# Patient Record
Sex: MALE | Race: WHITE | Employment: FULL TIME | URBAN - METROPOLITAN AREA
[De-identification: names, ages, dates, MRNs, and addresses within clinical notes are randomized per-mention and may not be internally consistent; named-entity substitution may affect disease eponyms.]

---

## 2018-07-24 ENCOUNTER — RECORDS - HEALTHEAST (OUTPATIENT)
Dept: LAB | Facility: CLINIC | Age: 53
End: 2018-07-24

## 2018-07-24 LAB
ALBUMIN SERPL-MCNC: 4.5 G/DL (ref 3.5–5)
ALP SERPL-CCNC: 45 U/L (ref 45–120)
ALT SERPL W P-5'-P-CCNC: 24 U/L (ref 0–45)
ANION GAP SERPL CALCULATED.3IONS-SCNC: 10 MMOL/L (ref 5–18)
AST SERPL W P-5'-P-CCNC: 30 U/L (ref 0–40)
BILIRUB SERPL-MCNC: 1.2 MG/DL (ref 0–1)
BUN SERPL-MCNC: 17 MG/DL (ref 8–22)
CALCIUM SERPL-MCNC: 9.9 MG/DL (ref 8.5–10.5)
CHLORIDE BLD-SCNC: 104 MMOL/L (ref 98–107)
CHOLEST SERPL-MCNC: 195 MG/DL
CO2 SERPL-SCNC: 27 MMOL/L (ref 22–31)
CREAT SERPL-MCNC: 0.83 MG/DL (ref 0.7–1.3)
ERYTHROCYTE [DISTWIDTH] IN BLOOD BY AUTOMATED COUNT: 12.2 % (ref 11–14.5)
FASTING STATUS PATIENT QL REPORTED: NORMAL
GFR SERPL CREATININE-BSD FRML MDRD: >60 ML/MIN/1.73M2
GLUCOSE BLD-MCNC: 94 MG/DL (ref 70–125)
HCT VFR BLD AUTO: 42.9 % (ref 40–54)
HDLC SERPL-MCNC: 58 MG/DL
HGB BLD-MCNC: 15.6 G/DL (ref 14–18)
LDLC SERPL CALC-MCNC: 124 MG/DL
MCH RBC QN AUTO: 31.8 PG (ref 27–34)
MCHC RBC AUTO-ENTMCNC: 36.4 G/DL (ref 32–36)
MCV RBC AUTO: 87 FL (ref 80–100)
PLATELET # BLD AUTO: 217 THOU/UL (ref 140–440)
PMV BLD AUTO: 10.3 FL (ref 8.5–12.5)
POTASSIUM BLD-SCNC: 4.5 MMOL/L (ref 3.5–5)
PROT SERPL-MCNC: 6.9 G/DL (ref 6–8)
PSA SERPL-MCNC: 0.7 NG/ML (ref 0–3.5)
RBC # BLD AUTO: 4.91 MILL/UL (ref 4.4–6.2)
SODIUM SERPL-SCNC: 141 MMOL/L (ref 136–145)
TRIGL SERPL-MCNC: 66 MG/DL
TSH SERPL DL<=0.005 MIU/L-ACNC: 1.33 UIU/ML (ref 0.3–5)
WBC: 4.1 THOU/UL (ref 4–11)

## 2019-04-25 ENCOUNTER — RECORDS - HEALTHEAST (OUTPATIENT)
Dept: LAB | Facility: CLINIC | Age: 54
End: 2019-04-25

## 2019-04-25 LAB
ALBUMIN SERPL-MCNC: 4.8 G/DL (ref 3.5–5)
ALP SERPL-CCNC: 47 U/L (ref 45–120)
ALT SERPL W P-5'-P-CCNC: 35 U/L (ref 0–45)
ANION GAP SERPL CALCULATED.3IONS-SCNC: 12 MMOL/L (ref 5–18)
AST SERPL W P-5'-P-CCNC: 32 U/L (ref 0–40)
BILIRUB SERPL-MCNC: 0.7 MG/DL (ref 0–1)
BUN SERPL-MCNC: 10 MG/DL (ref 8–22)
CALCIUM SERPL-MCNC: 10.9 MG/DL (ref 8.5–10.5)
CHLORIDE BLD-SCNC: 99 MMOL/L (ref 98–107)
CHOLEST SERPL-MCNC: 233 MG/DL
CO2 SERPL-SCNC: 28 MMOL/L (ref 22–31)
CREAT SERPL-MCNC: 0.84 MG/DL (ref 0.7–1.3)
FASTING STATUS PATIENT QL REPORTED: ABNORMAL
GFR SERPL CREATININE-BSD FRML MDRD: >60 ML/MIN/1.73M2
GLUCOSE BLD-MCNC: 88 MG/DL (ref 70–125)
HDLC SERPL-MCNC: 57 MG/DL
LDLC SERPL CALC-MCNC: 156 MG/DL
MAGNESIUM SERPL-MCNC: 2.4 MG/DL (ref 1.8–2.6)
POTASSIUM BLD-SCNC: 4.4 MMOL/L (ref 3.5–5)
PROT SERPL-MCNC: 7.4 G/DL (ref 6–8)
SODIUM SERPL-SCNC: 139 MMOL/L (ref 136–145)
TRIGL SERPL-MCNC: 102 MG/DL

## 2019-08-19 ENCOUNTER — RECORDS - HEALTHEAST (OUTPATIENT)
Dept: LAB | Facility: CLINIC | Age: 54
End: 2019-08-19

## 2019-08-19 LAB
C DIFF TOX B STL QL: NEGATIVE
RIBOTYPE 027/NAP1/BI: NORMAL

## 2019-08-20 LAB
G LAMBLIA AG STL QL IA: NORMAL
O+P STL MICRO: NORMAL
SHIGA TOXIN 1: NEGATIVE
SHIGA TOXIN 2: NEGATIVE

## 2019-08-22 LAB — BACTERIA SPEC CULT: NORMAL

## 2020-06-16 ENCOUNTER — RECORDS - HEALTHEAST (OUTPATIENT)
Dept: LAB | Facility: CLINIC | Age: 55
End: 2020-06-16

## 2020-06-16 LAB
ALBUMIN SERPL-MCNC: 4.6 G/DL (ref 3.5–5)
ALP SERPL-CCNC: 54 U/L (ref 45–120)
ALT SERPL W P-5'-P-CCNC: 24 U/L (ref 0–45)
ANION GAP SERPL CALCULATED.3IONS-SCNC: 9 MMOL/L (ref 5–18)
AST SERPL W P-5'-P-CCNC: 23 U/L (ref 0–40)
BILIRUB SERPL-MCNC: 1.1 MG/DL (ref 0–1)
BUN SERPL-MCNC: 12 MG/DL (ref 8–22)
CALCIUM SERPL-MCNC: 10.1 MG/DL (ref 8.5–10.5)
CHLORIDE BLD-SCNC: 101 MMOL/L (ref 98–107)
CHOLEST SERPL-MCNC: 229 MG/DL
CO2 SERPL-SCNC: 29 MMOL/L (ref 22–31)
CREAT SERPL-MCNC: 0.89 MG/DL (ref 0.7–1.3)
ERYTHROCYTE [DISTWIDTH] IN BLOOD BY AUTOMATED COUNT: 12 % (ref 11–14.5)
FASTING STATUS PATIENT QL REPORTED: ABNORMAL
GFR SERPL CREATININE-BSD FRML MDRD: >60 ML/MIN/1.73M2
GLUCOSE BLD-MCNC: 95 MG/DL (ref 70–125)
HCT VFR BLD AUTO: 47.9 % (ref 40–54)
HDLC SERPL-MCNC: 53 MG/DL
HGB BLD-MCNC: 16.4 G/DL (ref 14–18)
LDLC SERPL CALC-MCNC: 155 MG/DL
MCH RBC QN AUTO: 31.4 PG (ref 27–34)
MCHC RBC AUTO-ENTMCNC: 34.2 G/DL (ref 32–36)
MCV RBC AUTO: 92 FL (ref 80–100)
PLATELET # BLD AUTO: 241 THOU/UL (ref 140–440)
PMV BLD AUTO: 11 FL (ref 8.5–12.5)
POTASSIUM BLD-SCNC: 4 MMOL/L (ref 3.5–5)
PROT SERPL-MCNC: 7.4 G/DL (ref 6–8)
RBC # BLD AUTO: 5.22 MILL/UL (ref 4.4–6.2)
SODIUM SERPL-SCNC: 139 MMOL/L (ref 136–145)
TRIGL SERPL-MCNC: 107 MG/DL
TSH SERPL DL<=0.005 MIU/L-ACNC: 1.26 UIU/ML (ref 0.3–5)
WBC: 4.9 THOU/UL (ref 4–11)

## 2020-06-18 LAB — B BURGDOR IGG+IGM SER QL: 0.04 INDEX VALUE

## 2020-06-19 LAB
E CHAFFEENSIS IGG TITR SER IF: NORMAL {TITER}
E CHAFFEENSIS IGM TITR SER IF: NORMAL {TITER}

## 2020-10-08 ENCOUNTER — RECORDS - HEALTHEAST (OUTPATIENT)
Dept: LAB | Facility: CLINIC | Age: 55
End: 2020-10-08

## 2020-10-09 LAB — BACTERIA SPEC CULT: NO GROWTH

## 2021-04-27 ENCOUNTER — RECORDS - HEALTHEAST (OUTPATIENT)
Dept: ADMINISTRATIVE | Facility: OTHER | Age: 56
End: 2021-04-27

## 2021-04-27 ENCOUNTER — AMBULATORY - HEALTHEAST (OUTPATIENT)
Dept: CARDIOLOGY | Facility: CLINIC | Age: 56
End: 2021-04-27

## 2021-04-29 ENCOUNTER — OFFICE VISIT - HEALTHEAST (OUTPATIENT)
Dept: CARDIOLOGY | Facility: CLINIC | Age: 56
End: 2021-04-29

## 2021-04-29 DIAGNOSIS — I25.10 ATHEROSCLEROSIS OF NATIVE CORONARY ARTERY OF NATIVE HEART WITHOUT ANGINA PECTORIS: ICD-10-CM

## 2021-04-29 LAB
ATRIAL RATE - MUSE: 66 BPM
DIASTOLIC BLOOD PRESSURE - MUSE: NORMAL
INTERPRETATION ECG - MUSE: NORMAL
P AXIS - MUSE: 74 DEGREES
PR INTERVAL - MUSE: 188 MS
QRS DURATION - MUSE: 106 MS
QT - MUSE: 406 MS
QTC - MUSE: 425 MS
R AXIS - MUSE: 64 DEGREES
SYSTOLIC BLOOD PRESSURE - MUSE: NORMAL
T AXIS - MUSE: 51 DEGREES
VENTRICULAR RATE- MUSE: 66 BPM

## 2021-04-29 RX ORDER — ATORVASTATIN CALCIUM 10 MG/1
40 TABLET, FILM COATED ORAL DAILY
Qty: 90 TABLET | Refills: 3 | Status: SHIPPED | OUTPATIENT
Start: 2021-04-29

## 2021-04-29 ASSESSMENT — MIFFLIN-ST. JEOR: SCORE: 1688.34

## 2021-06-05 VITALS
HEART RATE: 71 BPM | DIASTOLIC BLOOD PRESSURE: 70 MMHG | WEIGHT: 171 LBS | SYSTOLIC BLOOD PRESSURE: 134 MMHG | BODY MASS INDEX: 21.26 KG/M2 | RESPIRATION RATE: 14 BRPM | HEIGHT: 75 IN

## 2021-06-16 PROBLEM — I25.10 CORONARY ATHEROSCLEROSIS OF NATIVE CORONARY ARTERY: Status: ACTIVE | Noted: 2021-04-29

## 2021-06-17 NOTE — PATIENT INSTRUCTIONS - HE
1. Start atorvastatin 10 mg daily.  This should help to bring your LDL down below 70 with a diet you can live with.  2. Moderate your exercise, 150 minutes of moderate exercise each week is adequate to maintain cardiovascular health.  3. Your chest discomfort today is musculoskeletal in nature and is not related to your heart.  4. Follow-up as needed only

## 2021-06-30 NOTE — PROGRESS NOTES
Progress Notes by Loy Monroy MD at 4/29/2021  1:50 PM     Author: Loy Monroy MD Service: -- Author Type: Physician    Filed: 4/29/2021  3:40 PM Encounter Date: 4/29/2021 Status: Signed    : Loy Monroy MD (Physician)         CARDIOLOGY CLINIC CONSULT NOTE     Assessment/Plan:   1.  Atypical chest discomfort.  Likely musculoskeletal in nature.  Reassured  2.  Elevated calcium score with LDL of 100.  Discussed potential benefit of further lipid-lowering therapy.  Would suggest a trial of low-dose atorvastatin 10 mg daily, to see if he can reach an LDL of less than 70 on this agent.  We discussed that there is no reason that a recheck of his calcium score would be helpful.  We also discussed that in general his lifestyle choices suggest a lowered risk for coronary artery disease, and that resuming moderate regular physical activity as a part of that optimal lifestyle for coronary disease reduction.    Follow-up as needed     History of Present Illness:     It is my pleasure to see Juan Joseph at the [facility] clinic for evaluation of atypical chest discomfort.    Juan Joseph is a 56 y.o. male with a past medical history of hyperlipidemia and a family history of coronary disease with father dying at age 53 of a myocardial infarction after previously undergoing 2 bypass surgeries.  He takes no medications but is an avid runner, having completed 30 marathons and runs currently 2 to 4 miles several days a week.    A year ago he was experiencing some dyspnea even at rest and fatigue along with dizziness, tinnitus and lightheadedness.  In retrospect he wonders if he did not have COVID-19.  He did undergo cardiac evaluation at that time including a stress echocardiogram which showed no evidence of inducible ischemia, and had a coronary CT calcium score which showed a level of 350, at the 94th percentile for his age group.  He embarked on aggressive weight-based  diet and succeeded in losing 25 pounds and dropping his total cholesterol from 239 down to 169.  His LDL however remains somewhat elevated at 100.  He does not feel that he could continue this diet long-term as it was quite restrictive.  Continue to have occasional atypical discomfort.  He has had occasional dizziness and lightheadedness after running.  He has also experienced some left upper back pain can be provoked by arm movement.  He is anxious about this discomfort and stopped running.    Had no exertional discomfort while running and his stamina is stable.  He has received his Covid vaccine.  The second dose of the vaccine immediately preceded the onset of some of these symptoms which is caused him additional concern.    Past Medical History:     Patient Active Problem List   Diagnosis   ? Coronary atherosclerosis of native coronary artery       Past Surgical History:   History reviewed. No pertinent surgical history.    Family History:     Family History   Problem Relation Age of Onset   ? Acute Myocardial Infarction Father 53         Social History:    reports that he has never smoked. He does not have any smokeless tobacco history on file.  Drinks 2 or 3 beers about every other week  Works at [employer] as a   Exercise: Avid runner, 2 to 4 miles 2 to 3 days a week    Sleep: No snoring but frequently awakens at night unable to return to sleep.  No associated shortness of breath or diaphoresis.  No daytime sleepiness    Meds:     Current Outpatient Medications   Medication Sig Dispense Refill   ? multivit with minerals/lutein (MULTIVITAMIN 50 PLUS ORAL) 1 tab(s)       No current facility-administered medications for this visit.        Allergies:   Patient has no known allergies.    Review of Systems:     General: WNL  Eyes: WNL  Ears/Nose/Throat: WNL  Lungs: WNL  Heart: WNL  Stomach: WNL  Bladder: WNL  Muscle/Joints: WNL  Skin: WNL  Nervous System: Dizziness  Mental Health: WNL     Blood:  "WNL        Objective:      Physical Exam  171 lb (77.6 kg)  6' 2.5\" (1.892 m)  Body mass index is 21.66 kg/m .  /70 (Patient Site: Left Arm, Patient Position: Sitting, Cuff Size: Adult Regular)   Pulse 71   Resp 14   Ht 6' 2.5\" (1.892 m)   Wt 171 lb (77.6 kg)   BMI 21.66 kg/m          General Appearance : Awake, Alert, No acute distress.  Anxious  HEENT: No Scleral icterus; the mucous membranes were pink and moist.  Conjunctivae not injected  Neck:  No cervical bruits, jugular venous distention, or thyromegaly   Chest: The spine was straight. Chest wall symmetric  Lungs: Respirations unlabored; the lungs are clear to auscultation.  No wheezing   Cardiovascular:   Normal point of maximal impulse.  Auscultation reveals normal first and second heart sounds with no murmurs, rubs, or gallops.  Carotid, radial, and dorsalis pedal pulses are intact and symmetric.  Abdomen: Flat.  No organomegaly, masses, bruits, or tenderness. Bowels sounds are present  Extremities: No edema  Skin: No xanthelasma. Warm, Dry.  Musculoskeletal: No tenderness.  Neurologic: Alert and oriented ×3. Speech is fluent.      EKG (personally reviewed):  Normal sinus rhythm with sinus arrhythmia.  Normal ECG    Cardiac Imaging Studies:  Stress echo 7/2020: [facility]  1. Good effort tolerance.  12 minutes 22 seconds on Baltazar protocol, 100% of predicted maximal heart rate  2. Adequate stress for interpretation with patient achieving 100 percent   of predicted maximum HR.  3. No subjective, hemodynamic or ECG evidence of inducible ischemia.  4. No echocardiographic evidence of ischemia.    Coronary calcium score [facility] 6/2020:  1. Total Agatston calcium score is 350, which places the patient at the   94th percentile in comparison to subjects of the same age, gender, and   race/ethnicity who are free of clinical cardiovascular disease and treated   diabetes in the PEDROZA database.    Lab Review   Lab Results "   Component Value Date     06/16/2020    K 4.0 06/16/2020     06/16/2020    CO2 29 06/16/2020    BUN 12 06/16/2020    CREATININE 0.89 06/16/2020    CALCIUM 10.1 06/16/2020     Lab Results   Component Value Date    WBC 4.9 06/16/2020    HGB 16.4 06/16/2020    HCT 47.9 06/16/2020    MCV 92 06/16/2020     06/16/2020     Lab Results   Component Value Date    CHOL 229 (H) 06/16/2020    TRIG 107 06/16/2020    HDL 53 06/16/2020    LDLCALC 155 (H) 06/16/2020     No results found for: TROPONINI  No results found for: BNP  Lab Results   Component Value Date    TSH 1.26 06/16/2020       Loy Monroy MD [facility]      This note created using Dragon voice recognition software. Sound alike errors may have escaped editing.

## 2021-08-23 ENCOUNTER — LAB REQUISITION (OUTPATIENT)
Dept: LAB | Facility: CLINIC | Age: 56
End: 2021-08-23
Payer: COMMERCIAL

## 2021-08-23 DIAGNOSIS — E78.2 MIXED HYPERLIPIDEMIA: ICD-10-CM

## 2021-08-23 DIAGNOSIS — R20.2 PARESTHESIA OF SKIN: ICD-10-CM

## 2021-08-23 DIAGNOSIS — Z12.5 ENCOUNTER FOR SCREENING FOR MALIGNANT NEOPLASM OF PROSTATE: ICD-10-CM

## 2021-08-23 LAB
ALBUMIN SERPL-MCNC: 4.5 G/DL (ref 3.5–5)
ALP SERPL-CCNC: 56 U/L (ref 45–120)
ALT SERPL W P-5'-P-CCNC: 32 U/L (ref 0–45)
ANION GAP SERPL CALCULATED.3IONS-SCNC: 10 MMOL/L (ref 5–18)
AST SERPL W P-5'-P-CCNC: 34 U/L (ref 0–40)
BILIRUB SERPL-MCNC: 1 MG/DL (ref 0–1)
BUN SERPL-MCNC: 13 MG/DL (ref 8–22)
CALCIUM SERPL-MCNC: 10.1 MG/DL (ref 8.5–10.5)
CHLORIDE BLD-SCNC: 102 MMOL/L (ref 98–107)
CHOLEST SERPL-MCNC: 149 MG/DL
CK SERPL-CCNC: 220 U/L (ref 30–190)
CO2 SERPL-SCNC: 27 MMOL/L (ref 22–31)
CREAT SERPL-MCNC: 0.8 MG/DL (ref 0.7–1.3)
GFR SERPL CREATININE-BSD FRML MDRD: >90 ML/MIN/1.73M2
GLUCOSE BLD-MCNC: 92 MG/DL (ref 70–125)
HDLC SERPL-MCNC: 55 MG/DL
LDLC SERPL CALC-MCNC: 81 MG/DL
POTASSIUM BLD-SCNC: 4.8 MMOL/L (ref 3.5–5)
PROT SERPL-MCNC: 7.2 G/DL (ref 6–8)
PSA SERPL-MCNC: 0.66 UG/L (ref 0–3.5)
SODIUM SERPL-SCNC: 139 MMOL/L (ref 136–145)
TRIGL SERPL-MCNC: 64 MG/DL
VIT B12 SERPL-MCNC: 940 PG/ML (ref 213–816)

## 2021-08-23 PROCEDURE — 80053 COMPREHEN METABOLIC PANEL: CPT | Mod: ORL | Performed by: FAMILY MEDICINE

## 2021-08-23 PROCEDURE — 80061 LIPID PANEL: CPT | Mod: ORL | Performed by: FAMILY MEDICINE

## 2021-08-23 PROCEDURE — 82607 VITAMIN B-12: CPT | Mod: ORL | Performed by: FAMILY MEDICINE

## 2021-08-23 PROCEDURE — 82550 ASSAY OF CK (CPK): CPT | Mod: ORL | Performed by: FAMILY MEDICINE

## 2021-08-23 PROCEDURE — G0103 PSA SCREENING: HCPCS | Mod: ORL | Performed by: FAMILY MEDICINE

## 2021-09-24 ENCOUNTER — LAB REQUISITION (OUTPATIENT)
Dept: LAB | Facility: CLINIC | Age: 56
End: 2021-09-24

## 2021-09-24 DIAGNOSIS — R20.2 PARESTHESIA OF SKIN: ICD-10-CM

## 2021-09-24 DIAGNOSIS — M79.609 PAIN IN UNSPECIFIED LIMB: ICD-10-CM

## 2021-09-24 LAB
ERYTHROCYTE [SEDIMENTATION RATE] IN BLOOD BY WESTERGREN METHOD: 2 MM/HR (ref 0–15)
TSH SERPL DL<=0.005 MIU/L-ACNC: 2.04 UIU/ML (ref 0.3–5)

## 2021-09-24 PROCEDURE — 85652 RBC SED RATE AUTOMATED: CPT | Performed by: FAMILY MEDICINE

## 2021-09-24 PROCEDURE — 84443 ASSAY THYROID STIM HORMONE: CPT | Performed by: FAMILY MEDICINE

## 2021-09-24 PROCEDURE — 36415 COLL VENOUS BLD VENIPUNCTURE: CPT | Performed by: FAMILY MEDICINE

## 2021-09-24 PROCEDURE — 86618 LYME DISEASE ANTIBODY: CPT | Performed by: FAMILY MEDICINE

## 2021-09-27 LAB — B BURGDOR IGG+IGM SER QL: 0.04

## 2022-03-01 ENCOUNTER — HOSPITAL ENCOUNTER (EMERGENCY)
Facility: HOSPITAL | Age: 57
Discharge: HOME | End: 2022-03-01
Attending: EMERGENCY MEDICINE | Admitting: EMERGENCY MEDICINE
Payer: COMMERCIAL

## 2022-03-01 ENCOUNTER — APPOINTMENT (OUTPATIENT)
Dept: CT IMAGING | Facility: HOSPITAL | Age: 57
End: 2022-03-01
Attending: EMERGENCY MEDICINE
Payer: COMMERCIAL

## 2022-03-01 VITALS
HEART RATE: 61 BPM | TEMPERATURE: 99.2 F | SYSTOLIC BLOOD PRESSURE: 124 MMHG | DIASTOLIC BLOOD PRESSURE: 82 MMHG | BODY MASS INDEX: 23.49 KG/M2 | OXYGEN SATURATION: 100 % | WEIGHT: 183 LBS | RESPIRATION RATE: 11 BRPM | HEIGHT: 74 IN

## 2022-03-01 DIAGNOSIS — R07.9 CHEST PAIN, UNSPECIFIED TYPE: ICD-10-CM

## 2022-03-01 PROBLEM — Z86.16 PERSONAL HISTORY OF COVID-19: Status: ACTIVE | Noted: 2022-03-01

## 2022-03-01 PROBLEM — R20.9 SENSORY DISTURBANCE: Status: ACTIVE | Noted: 2021-11-05

## 2022-03-01 PROBLEM — N20.0 KIDNEY STONE: Status: ACTIVE | Noted: 2022-03-01

## 2022-03-01 PROBLEM — E78.2 MIXED HYPERLIPIDEMIA: Status: ACTIVE | Noted: 2022-03-01

## 2022-03-01 LAB
ANION GAP SERPL CALCULATED.3IONS-SCNC: 12 MMOL/L (ref 5–18)
BASOPHILS # BLD AUTO: 0.1 10E3/UL (ref 0–0.2)
BASOPHILS NFR BLD AUTO: 1 %
BUN SERPL-MCNC: 12 MG/DL (ref 8–22)
CALCIUM SERPL-MCNC: 9.6 MG/DL (ref 8.5–10.5)
CHLORIDE BLD-SCNC: 101 MMOL/L (ref 98–107)
CO2 SERPL-SCNC: 25 MMOL/L (ref 22–31)
CREAT SERPL-MCNC: 0.79 MG/DL (ref 0.7–1.3)
EOSINOPHIL # BLD AUTO: 0.3 10E3/UL (ref 0–0.7)
EOSINOPHIL NFR BLD AUTO: 4 %
ERYTHROCYTE [DISTWIDTH] IN BLOOD BY AUTOMATED COUNT: 11.9 % (ref 10–15)
GFR SERPL CREATININE-BSD FRML MDRD: >90 ML/MIN/1.73M2
GLUCOSE BLD-MCNC: 115 MG/DL (ref 70–125)
HCT VFR BLD AUTO: 43.2 % (ref 40–53)
HGB BLD-MCNC: 15.4 G/DL (ref 13.3–17.7)
IMM GRANULOCYTES # BLD: 0 10E3/UL
IMM GRANULOCYTES NFR BLD: 0 %
LYMPHOCYTES # BLD AUTO: 2.7 10E3/UL (ref 0.8–5.3)
LYMPHOCYTES NFR BLD AUTO: 47 %
MCH RBC QN AUTO: 32.6 PG (ref 26.5–33)
MCHC RBC AUTO-ENTMCNC: 35.6 G/DL (ref 31.5–36.5)
MCV RBC AUTO: 92 FL (ref 78–100)
MONOCYTES # BLD AUTO: 0.6 10E3/UL (ref 0–1.3)
MONOCYTES NFR BLD AUTO: 10 %
NEUTROPHILS # BLD AUTO: 2.2 10E3/UL (ref 1.6–8.3)
NEUTROPHILS NFR BLD AUTO: 38 %
NRBC # BLD AUTO: 0 10E3/UL
NRBC BLD AUTO-RTO: 0 /100
PLATELET # BLD AUTO: 236 10E3/UL (ref 150–450)
POTASSIUM BLD-SCNC: 4.1 MMOL/L (ref 3.5–5)
RBC # BLD AUTO: 4.72 10E6/UL (ref 4.4–5.9)
SODIUM SERPL-SCNC: 138 MMOL/L (ref 136–145)
TROPONIN I SERPL-MCNC: <0.01 NG/ML (ref 0–0.29)
TROPONIN I SERPL-MCNC: <0.01 NG/ML (ref 0–0.29)
WBC # BLD AUTO: 5.8 10E3/UL (ref 4–11)

## 2022-03-01 PROCEDURE — 74174 CTA ABD&PLVS W/CONTRAST: CPT

## 2022-03-01 PROCEDURE — 82310 ASSAY OF CALCIUM: CPT | Performed by: EMERGENCY MEDICINE

## 2022-03-01 PROCEDURE — 84484 ASSAY OF TROPONIN QUANT: CPT | Performed by: EMERGENCY MEDICINE

## 2022-03-01 PROCEDURE — 250N000011 HC RX IP 250 OP 636: Performed by: EMERGENCY MEDICINE

## 2022-03-01 PROCEDURE — 99285 EMERGENCY DEPT VISIT HI MDM: CPT | Mod: 25

## 2022-03-01 PROCEDURE — 85025 COMPLETE CBC W/AUTO DIFF WBC: CPT | Performed by: EMERGENCY MEDICINE

## 2022-03-01 PROCEDURE — 93005 ELECTROCARDIOGRAM TRACING: CPT | Performed by: EMERGENCY MEDICINE

## 2022-03-01 PROCEDURE — 36415 COLL VENOUS BLD VENIPUNCTURE: CPT | Performed by: EMERGENCY MEDICINE

## 2022-03-01 RX ORDER — IOPAMIDOL 755 MG/ML
100 INJECTION, SOLUTION INTRAVASCULAR ONCE
Status: COMPLETED | OUTPATIENT
Start: 2022-03-01 | End: 2022-03-01

## 2022-03-01 RX ADMIN — IOPAMIDOL 100 ML: 755 INJECTION, SOLUTION INTRAVENOUS at 18:32

## 2022-03-01 NOTE — ED PROVIDER NOTES
ED triage provider note    Patient evaluated for chest pain.  Positive family history    He appears anxious.  Blood pressure is elevated    Cardiac work-up initiated; I have ordered CT of the chest because he is having chest and back pain with radiation of the left upper extremity    symptoms of left arm pain and numbness with left scapular pain can be caused by cervical radiculopathy.    EKG does not show any acute ischemic changes     Jr Nance MD  03/01/22 1704       Jr Nance MD  03/01/22 0134

## 2022-03-01 NOTE — ED NOTES
"Patient irritated with staff complaining in the hallway about the process here and this writer went to explain to him the process for a chest pain like complaint. Patient was again irritated with this writer and states \"okay yeesh I was just saying its just not how [facility] does it.\"  "

## 2022-03-01 NOTE — ED TRIAGE NOTES
"Patient reports he has back pain that is left sided and now has some pain in the left arm pit and under his arm pit with some numbness under the left arm pit. Patient reports he is anxious as well. He states that he is concerned for an MI due to his family history. Patient is covid recovered x1 month but states he did not feel \"the same since having covid\"  "

## 2022-03-02 LAB
ATRIAL RATE - MUSE: 81 BPM
DIASTOLIC BLOOD PRESSURE - MUSE: NORMAL MMHG
INTERPRETATION ECG - MUSE: NORMAL
P AXIS - MUSE: 76 DEGREES
PR INTERVAL - MUSE: 192 MS
QRS DURATION - MUSE: 108 MS
QT - MUSE: 394 MS
QTC - MUSE: 457 MS
R AXIS - MUSE: 52 DEGREES
SYSTOLIC BLOOD PRESSURE - MUSE: NORMAL MMHG
T AXIS - MUSE: 39 DEGREES
VENTRICULAR RATE- MUSE: 81 BPM

## 2022-03-02 NOTE — DISCHARGE INSTRUCTIONS
Follow-up with your primary care doctor if the numbness to the arm is not resolving.  Given your exertional calf pain would also recommend consideration for outpatient arterial ultrasound of the legs to evaluate for claudication.  Because it has been greater than a year since your last stress test, with your family history, could consider outpatient stress test.  Discussed this with your PCP to help arrange.

## 2022-03-02 NOTE — ED PROVIDER NOTES
EMERGENCY DEPARTMENT ENCOUNTER      NAME: Juan Joseph  AGE: 56 year old male  YOB: 1965  MRN: [id number]  EVALUATION DATE & TIME: 3/1/2022  6:07 PM    PCP: Juan Bullard    ED PROVIDER: Chayo Jimenez MD    No chief complaint on file.        FINAL IMPRESSION:  1. Chest pain, unspecified type          ED COURSE & MEDICAL DECISION MAKING:    Pertinent Labs & Imaging studies reviewed. (See chart for details)  56 year old male with history of HLD and previous kidney stone and significant family history of CAD with dad having CABG in 30s, and known mild CAD in himself based on CT calcium score who presents to the Emergency Department for evaluation of left-sided scapular back pain, left-sided anterior chest pain as well as some numbness and tingling to the triceps region on the left that started this morning at around 11 AM.  Differential includes ACS, atypical chest pain, pulmonary embolism, dissection.  Patient does not have any weakness to the extremities, and has subjective anesthesia to the tricep region on the left arm only.  Not consistent with CVA.  He does not recall any compressive injury to the axilla for a peripheral neuropathy.    Patient placed on monitor, IV established and blood obtained.  Twelve-lead EKG shows sinus rhythm.  CBC, BMP, troponin unremarkable.  CTA chest abdomen pelvis unremarkable for dissection.  A EKG and troponin were repeated, no evolving changes and troponin remains undetectable.  Per chart review has been slightly over 1 year since patient's most recent stress test.  Discussed following up with his primary for repeat outpatient stress test.  His paresthesia/anesthesia to the left tricep region persists and I do not know what to make of this.  This was discussed with patient and spouse.  I do not think he warrants any additional emergent testing here, but certainly needs close follow-up with PCP if this is not improving for further outpatient work-up.   Warning signs return to ED discussed, and safe for discharge home.            6:28PM I attempted to interview the patient, currently at CT.  7:08PM I met with the patient for the initial interview and physical examination. Discussed plan for treatment and workup in the ED. PPE: Provider wore surgical mask.  9:15 PM I reassessed the patient and updated him on the results. I discussed the plan for discharge with the patient, and patient is agreeable. We discussed supportive cares at home and reasons for return to the ER including new or worsening symptoms - all questions and concerns addressed. Patient to be discharged by RN.     At the conclusion of the encounter I discussed the results of all of the tests and the disposition. The questions were answered. The patient or family acknowledged understanding and was agreeable with the care plan.      MEDICATIONS GIVEN IN THE EMERGENCY:  Medications   iopamidol (ISOVUE-370) solution 100 mL (100 mLs Intravenous Given 3/1/22 1832)       NEW PRESCRIPTIONS STARTED AT TODAY'S ER VISIT  There are no discharge medications for this patient.         =================================================================    HPI    Patient information was obtained from: patient    Use of Intrepreter: N/A       Juan Joseph is a 56 year old male with pertinent medical history of coronary atherosclerosis, hyperlipidemia, and previous Covid-19 infection who presents to this ED with wife for evaluation of chest pain.     Per chart review, patient had a calcium score checked on 4/27/21, result was 350 and estimated to be at the 94th percentile. No significant incidental extracardiac findings.     Patient reports left upper back pain onset late this morning and subsequently developed left-sided chest tightness. A few hours later, he noticed complete numbness in his left arm, which only extends along the inner aspect of proximal arm to the proximal elbow. Back pain feels like a knot in one  "of his muscles. Chest tightness has been constant, is still present, and currently rated 1-2/10, adding that the back pain is more significant than chest pain. Back pain is not provoked with movement, but is provoked when he looks downward and bends his neck. No other numbness/tingling. Believes that his last stress test was in  at [place] and was normal. Endorses a family history of cardiac problems in his father, where he had a CABG in his 30s, repeat CABG in his 40s, and  from a heart attack at 53, also had history of heart disease. Adds that 2 of his 4 brothers have a history of hyperlipidemia.     Notes that he had \"weird\" paresthesias in his back, bilateral hands, and legs after receiving the 2nd dose of the Covid-19 vaccine. Had an MRI performed through [facility] at the end of , which only showed 2 herniated discs in his lower back and nothing to explain symptoms in his hands. Patient does not have any other associated complaints or concerns at this time.       REVIEW OF SYSTEMS  Constitutional:  Denies fever, chills, weight loss or weakness  Respiratory: No SOB, wheeze or cough  Cardiovascular:  No palpitations. Reports chest tightness (left)  GI:  Denies abdominal pain, nausea, vomiting, diarrhea  : Denies dysuria, denies hematuria  Musculoskeletal:  Denies any new swelling or loss of function. Reports back pain (left upper).  Skin:  Denies rash, pallor  Neurologic:  Denies headache, focal weakness. Reports numbness (left arm; inner aspect proximal arm to proximal elbow).  All other systems negative unless noted in HPI.      PAST MEDICAL HISTORY:  Past Medical History:   Diagnosis Date     Coronary artery disease     Per CT coronary angiogram     HLD (hyperlipidemia)      Kidney stone        PAST SURGICAL HISTORY:  History reviewed. No pertinent surgical history.    CURRENT MEDICATIONS:    None       ALLERGIES:  No Known Allergies    FAMILY HISTORY:  History reviewed. No pertinent family " "history.    SOCIAL HISTORY:  Social History     Tobacco Use     Smoking status: None     Smokeless tobacco: None   Substance Use Topics     Alcohol use: None     Drug use: None        VITALS:  Patient Vitals for the past 24 hrs:   BP Temp Temp src Pulse Resp SpO2 Height Weight   03/01/22 2132 124/82 -- -- 61 -- 100 % -- --   03/01/22 1900 135/83 -- -- 68 11 99 % -- --   03/01/22 1845 130/79 -- -- 69 12 99 % -- --   03/01/22 1701 (!) 170/97 99.2  F (37.3  C) Temporal 88 20 100 % 1.88 m (6' 2\") 83 kg (183 lb)       PHYSICAL EXAM    General Appearance: Well-appearing, well-nourished, no acute distress   Head:  Normocephalic  Eyes:   conjunctiva/corneas clear  ENT:   membranes are moist without pallor  Neck:  Supple, symmetrical, trachea midline, no adenopathy  Chest:  No tenderness or deformity, no crepitus  Cardio:  Regular rate and rhythm, no murmur/gallop/rub, 2+ pulses symmetric in all extremities  Pulm:  No respiratory distress, clear to auscultation bilaterally  Back:  No midline tenderness to palpation, no paraspinal tenderness, No CVA tenderness, normal ROM. Left medial scapular pain, reproducible with palpation.  Abdomen:  Soft, non-tender  Extremities: Moves all extremities normally, normal gait  Skin:  Skin warm, dry, no rashes  Neuro:  Alert and oriented ×3, moving all extremities, no gross sensory defects. Complete anesthesia to the inner aspect of tricep region of left arm.     RADIOLOGY/LABS:  Reviewed all pertinent imaging. Please see official radiology report. All pertinent labs reviewed and interpreted.    Results for orders placed or performed during the hospital encounter of 03/01/22   CTA Chest Abdomen Pelvis w Contrast    Impression    IMPRESSION:  1.  Normal CT angiogram of the chest, abdomen, and pelvis. No evidence of dissection or other acute vascular pathology.    2.  No evidence of pulmonary embolism.    3.  Mild coronary artery atherosclerotic calcification.    4.  3 mm nonobstructing stone " upper pole right kidney, with additional 1 to 2 mm radiodensity at the right UPJ. No evidence of hydronephrosis.    5.  Moderate stool throughout the colon with distal small bowel fecalization suggesting constipation.       Basic metabolic panel   Result Value Ref Range    Sodium 138 136 - 145 mmol/L    Potassium 4.1 3.5 - 5.0 mmol/L    Chloride 101 98 - 107 mmol/L    Carbon Dioxide (CO2) 25 22 - 31 mmol/L    Anion Gap 12 5 - 18 mmol/L    Urea Nitrogen 12 8 - 22 mg/dL    Creatinine 0.79 0.70 - 1.30 mg/dL    Calcium 9.6 8.5 - 10.5 mg/dL    Glucose 115 70 - 125 mg/dL    GFR Estimate >90 >60 mL/min/1.73m2   Result Value Ref Range    Troponin I <0.01 0.00 - 0.29 ng/mL   CBC with platelets and differential   Result Value Ref Range    WBC Count 5.8 4.0 - 11.0 10e3/uL    RBC Count 4.72 4.40 - 5.90 10e6/uL    Hemoglobin 15.4 13.3 - 17.7 g/dL    Hematocrit 43.2 40.0 - 53.0 %    MCV 92 78 - 100 fL    MCH 32.6 26.5 - 33.0 pg    MCHC 35.6 31.5 - 36.5 g/dL    RDW 11.9 10.0 - 15.0 %    Platelet Count 236 150 - 450 10e3/uL    % Neutrophils 38 %    % Lymphocytes 47 %    % Monocytes 10 %    % Eosinophils 4 %    % Basophils 1 %    % Immature Granulocytes 0 %    NRBCs per 100 WBC 0 <1 /100    Absolute Neutrophils 2.2 1.6 - 8.3 10e3/uL    Absolute Lymphocytes 2.7 0.8 - 5.3 10e3/uL    Absolute Monocytes 0.6 0.0 - 1.3 10e3/uL    Absolute Eosinophils 0.3 0.0 - 0.7 10e3/uL    Absolute Basophils 0.1 0.0 - 0.2 10e3/uL    Absolute Immature Granulocytes 0.0 <=0.4 10e3/uL    Absolute NRBCs 0.0 10e3/uL   Troponin I (now)   Result Value Ref Range    Troponin I <0.01 0.00 - 0.29 ng/mL       EKG:  Performed at: 1712    Impression: sinus rhythm with sinus arrhythmia. No previous for comparison    Rate: 81  Rhythm: sinus rhythm with sinus arrhythmia   Axis: 52  MD Interval: 192  QRS Interval: 108  QTc Interval: 457  ST Changes: none  Comparison: no previous    EKG:    Performed at: 2036    Impression: sinus rhythm with 1st degree AV block. No change  on comparison.     Rate: 67  Rhythm: sinus with 1st degree AV block  Axis: 51  PA Interval: 214  QRS Interval: 106  QTc Interval: 443  ST Changes: none  Comparison: 01-Mar-2022 20:36    I have independently reviewed and interpreted the EKG(s) documented above.        The creation of this record is based on the scribe s observations of the work being performed by Chayo Jimenez MD and the provider s statements to them. It was created on his behalf by Natalia Berry, a trained medical scribe. This document has been checked and approved by the attending provider.    Chayo Jimenez MD  Emergency Medicine  [facility] EMERGENCY DEPARTMENT  [facility] [facility] [id number] [phone]  Dept: [phone]     Chayo Jimenez MD  03/01/22 5676

## 2022-06-17 ENCOUNTER — LAB REQUISITION (OUTPATIENT)
Dept: LAB | Facility: CLINIC | Age: 57
End: 2022-06-17

## 2022-06-17 ENCOUNTER — LAB REQUISITION (OUTPATIENT)
Dept: LAB | Facility: CLINIC | Age: 57
End: 2022-06-17
Payer: COMMERCIAL

## 2022-06-17 DIAGNOSIS — Z86.16 PERSONAL HISTORY OF COVID-19: ICD-10-CM

## 2022-06-17 DIAGNOSIS — Z00.00 ENCOUNTER FOR GENERAL ADULT MEDICAL EXAMINATION WITHOUT ABNORMAL FINDINGS: ICD-10-CM

## 2022-06-17 DIAGNOSIS — E78.2 MIXED HYPERLIPIDEMIA: ICD-10-CM

## 2022-06-17 LAB
ALBUMIN SERPL-MCNC: 4.3 G/DL (ref 3.5–5)
ALP SERPL-CCNC: 52 U/L (ref 45–120)
ALT SERPL W P-5'-P-CCNC: 25 U/L (ref 0–45)
ANION GAP SERPL CALCULATED.3IONS-SCNC: 10 MMOL/L (ref 5–18)
AST SERPL W P-5'-P-CCNC: 32 U/L (ref 0–40)
BILIRUB SERPL-MCNC: 1 MG/DL (ref 0–1)
BUN SERPL-MCNC: 10 MG/DL (ref 8–22)
CALCIUM SERPL-MCNC: 9.9 MG/DL (ref 8.5–10.5)
CHLORIDE BLD-SCNC: 103 MMOL/L (ref 98–107)
CHOLEST SERPL-MCNC: 194 MG/DL
CO2 SERPL-SCNC: 28 MMOL/L (ref 22–31)
CREAT SERPL-MCNC: 0.77 MG/DL (ref 0.7–1.3)
FASTING STATUS PATIENT QL REPORTED: NORMAL
GFR SERPL CREATININE-BSD FRML MDRD: >90 ML/MIN/1.73M2
GLUCOSE BLD-MCNC: 91 MG/DL (ref 70–125)
HDLC SERPL-MCNC: 49 MG/DL
LDLC SERPL CALC-MCNC: 125 MG/DL
POTASSIUM BLD-SCNC: 4.4 MMOL/L (ref 3.5–5)
PROT SERPL-MCNC: 6.9 G/DL (ref 6–8)
PSA SERPL-MCNC: 0.8 UG/L (ref 0–3.5)
SODIUM SERPL-SCNC: 141 MMOL/L (ref 136–145)
TRIGL SERPL-MCNC: 99 MG/DL

## 2022-06-17 PROCEDURE — 80061 LIPID PANEL: CPT | Performed by: FAMILY MEDICINE

## 2022-06-17 PROCEDURE — 82040 ASSAY OF SERUM ALBUMIN: CPT | Performed by: FAMILY MEDICINE

## 2022-06-17 PROCEDURE — 80053 COMPREHEN METABOLIC PANEL: CPT | Performed by: FAMILY MEDICINE

## 2022-06-17 PROCEDURE — 86769 SARS-COV-2 COVID-19 ANTIBODY: CPT | Performed by: FAMILY MEDICINE

## 2022-06-17 PROCEDURE — 86769 SARS-COV-2 COVID-19 ANTIBODY: CPT | Mod: ORL | Performed by: FAMILY MEDICINE

## 2022-06-17 PROCEDURE — G0103 PSA SCREENING: HCPCS | Performed by: FAMILY MEDICINE

## 2022-06-19 LAB — SARS-COV-2 AB SERPL QL IA: POSITIVE

## 2022-08-16 ENCOUNTER — LAB REQUISITION (OUTPATIENT)
Dept: LAB | Facility: CLINIC | Age: 57
End: 2022-08-16

## 2022-08-16 DIAGNOSIS — E78.2 MIXED HYPERLIPIDEMIA: ICD-10-CM

## 2022-08-16 LAB
ALBUMIN SERPL BCG-MCNC: 4.9 G/DL (ref 3.5–5.2)
ALP SERPL-CCNC: 50 U/L (ref 40–129)
ALT SERPL W P-5'-P-CCNC: 26 U/L (ref 10–50)
AST SERPL W P-5'-P-CCNC: 32 U/L (ref 10–50)
BILIRUB DIRECT SERPL-MCNC: <0.2 MG/DL (ref 0–0.3)
BILIRUB SERPL-MCNC: 0.6 MG/DL
CHOLEST SERPL-MCNC: 145 MG/DL
HDLC SERPL-MCNC: 56 MG/DL
LDLC SERPL CALC-MCNC: 73 MG/DL
NONHDLC SERPL-MCNC: 89 MG/DL
PROT SERPL-MCNC: 6.9 G/DL (ref 6.4–8.3)
TRIGL SERPL-MCNC: 78 MG/DL

## 2022-08-16 PROCEDURE — 80076 HEPATIC FUNCTION PANEL: CPT | Performed by: FAMILY MEDICINE

## 2022-08-16 PROCEDURE — 80061 LIPID PANEL: CPT | Performed by: FAMILY MEDICINE

## 2023-06-20 ENCOUNTER — LAB REQUISITION (OUTPATIENT)
Dept: LAB | Facility: CLINIC | Age: 58
End: 2023-06-20

## 2023-06-20 DIAGNOSIS — E78.2 MIXED HYPERLIPIDEMIA: ICD-10-CM

## 2023-06-20 DIAGNOSIS — Z00.00 ENCOUNTER FOR GENERAL ADULT MEDICAL EXAMINATION WITHOUT ABNORMAL FINDINGS: ICD-10-CM

## 2023-06-20 PROCEDURE — 80061 LIPID PANEL: CPT | Performed by: FAMILY MEDICINE

## 2023-06-20 PROCEDURE — G0103 PSA SCREENING: HCPCS | Performed by: FAMILY MEDICINE

## 2023-06-20 PROCEDURE — 80053 COMPREHEN METABOLIC PANEL: CPT | Performed by: FAMILY MEDICINE

## 2023-06-21 LAB
ALBUMIN SERPL BCG-MCNC: 5.1 G/DL (ref 3.5–5.2)
ALP SERPL-CCNC: 50 U/L (ref 40–129)
ALT SERPL W P-5'-P-CCNC: 28 U/L (ref 0–70)
ANION GAP SERPL CALCULATED.3IONS-SCNC: 14 MMOL/L (ref 7–15)
AST SERPL W P-5'-P-CCNC: 32 U/L (ref 0–45)
BILIRUB SERPL-MCNC: 1 MG/DL
BUN SERPL-MCNC: 9.4 MG/DL (ref 6–20)
CALCIUM SERPL-MCNC: 10.3 MG/DL (ref 8.6–10)
CHLORIDE SERPL-SCNC: 101 MMOL/L (ref 98–107)
CHOLEST SERPL-MCNC: 174 MG/DL
CREAT SERPL-MCNC: 0.82 MG/DL (ref 0.67–1.17)
DEPRECATED HCO3 PLAS-SCNC: 26 MMOL/L (ref 22–29)
GFR SERPL CREATININE-BSD FRML MDRD: >90 ML/MIN/1.73M2
GLUCOSE SERPL-MCNC: 89 MG/DL (ref 70–99)
HDLC SERPL-MCNC: 61 MG/DL
LDLC SERPL CALC-MCNC: 89 MG/DL
NONHDLC SERPL-MCNC: 113 MG/DL
POTASSIUM SERPL-SCNC: 4.6 MMOL/L (ref 3.4–5.3)
PROT SERPL-MCNC: 7.2 G/DL (ref 6.4–8.3)
PSA SERPL DL<=0.01 NG/ML-MCNC: 0.92 NG/ML (ref 0–3.5)
SODIUM SERPL-SCNC: 141 MMOL/L (ref 136–145)
TRIGL SERPL-MCNC: 119 MG/DL

## 2024-07-23 ENCOUNTER — LAB REQUISITION (OUTPATIENT)
Dept: LAB | Facility: CLINIC | Age: 59
End: 2024-07-23
Payer: COMMERCIAL

## 2024-07-23 DIAGNOSIS — R68.83 CHILLS (WITHOUT FEVER): ICD-10-CM

## 2024-07-23 LAB — CRP SERPL-MCNC: 16.7 MG/L

## 2024-07-23 PROCEDURE — 86140 C-REACTIVE PROTEIN: CPT | Performed by: PHYSICIAN ASSISTANT

## 2025-03-06 ENCOUNTER — LAB REQUISITION (OUTPATIENT)
Dept: LAB | Facility: CLINIC | Age: 60
End: 2025-03-06

## 2025-03-06 ENCOUNTER — TRANSFERRED RECORDS (OUTPATIENT)
Dept: HEALTH INFORMATION MANAGEMENT | Facility: CLINIC | Age: 60
End: 2025-03-06
Payer: COMMERCIAL

## 2025-03-06 DIAGNOSIS — E78.2 MIXED HYPERLIPIDEMIA: ICD-10-CM

## 2025-03-06 DIAGNOSIS — Z12.5 ENCOUNTER FOR SCREENING FOR MALIGNANT NEOPLASM OF PROSTATE: ICD-10-CM

## 2025-03-06 LAB
ALBUMIN SERPL BCG-MCNC: 4.5 G/DL (ref 3.5–5.2)
ALP SERPL-CCNC: 47 U/L (ref 40–150)
ALT SERPL W P-5'-P-CCNC: 23 U/L (ref 0–70)
ANION GAP SERPL CALCULATED.3IONS-SCNC: 10 MMOL/L (ref 7–15)
APO A-I SERPL-MCNC: <6 MG/DL
AST SERPL W P-5'-P-CCNC: 32 U/L (ref 0–45)
BILIRUB SERPL-MCNC: 1 MG/DL
BUN SERPL-MCNC: 14 MG/DL (ref 6–20)
CALCIUM SERPL-MCNC: 9.7 MG/DL (ref 8.8–10.4)
CHLORIDE SERPL-SCNC: 101 MMOL/L (ref 98–107)
CHOLEST SERPL-MCNC: 221 MG/DL
CREAT SERPL-MCNC: 0.8 MG/DL (ref 0.67–1.17)
EGFRCR SERPLBLD CKD-EPI 2021: >90 ML/MIN/1.73M2
FASTING STATUS PATIENT QL REPORTED: YES
FASTING STATUS PATIENT QL REPORTED: YES
GLUCOSE SERPL-MCNC: 96 MG/DL (ref 70–99)
HCO3 SERPL-SCNC: 27 MMOL/L (ref 22–29)
HDLC SERPL-MCNC: 58 MG/DL
LDLC SERPL CALC-MCNC: 145 MG/DL
NONHDLC SERPL-MCNC: 163 MG/DL
POTASSIUM SERPL-SCNC: 4.1 MMOL/L (ref 3.4–5.3)
PROT SERPL-MCNC: 6.9 G/DL (ref 6.4–8.3)
PSA SERPL DL<=0.01 NG/ML-MCNC: 1.14 NG/ML (ref 0–3.5)
SODIUM SERPL-SCNC: 138 MMOL/L (ref 135–145)
TRIGL SERPL-MCNC: 91 MG/DL

## 2025-03-06 PROCEDURE — 82374 ASSAY BLOOD CARBON DIOXIDE: CPT | Performed by: FAMILY MEDICINE

## 2025-03-06 PROCEDURE — 84478 ASSAY OF TRIGLYCERIDES: CPT | Performed by: FAMILY MEDICINE

## 2025-03-06 PROCEDURE — G0103 PSA SCREENING: HCPCS | Performed by: FAMILY MEDICINE

## 2025-03-06 PROCEDURE — 83695 ASSAY OF LIPOPROTEIN(A): CPT | Performed by: FAMILY MEDICINE

## 2025-03-06 PROCEDURE — 80048 BASIC METABOLIC PNL TOTAL CA: CPT | Performed by: FAMILY MEDICINE

## 2025-03-07 ENCOUNTER — MEDICAL CORRESPONDENCE (OUTPATIENT)
Dept: HEALTH INFORMATION MANAGEMENT | Facility: CLINIC | Age: 60
End: 2025-03-07
Payer: COMMERCIAL

## 2025-03-18 ENCOUNTER — TELEPHONE (OUTPATIENT)
Dept: CARDIOLOGY | Facility: CLINIC | Age: 60
End: 2025-03-18
Payer: COMMERCIAL

## 2025-03-18 DIAGNOSIS — E78.00 PURE HYPERCHOLESTEROLEMIA: Primary | ICD-10-CM

## 2025-03-18 NOTE — TELEPHONE ENCOUNTER
Shant Garay  You44 minutes ago (2:00 PM)     HC  Wonderful! Yes please         ==order placed. Routed to liaison to check on pricing. -[facility]

## 2025-03-18 NOTE — TELEPHONE ENCOUNTER
Test claim 28 days supply for Repatha sureclick 140mg/ml     -$112.94 copay            Patient looks to have commercial insurance, should be eligible for a copay card through the    Website:  [email or web address]

## 2025-03-18 NOTE — TELEPHONE ENCOUNTER
Noted- appears to not require PA and is Tier 2 med. Will obtain order and route to pharmacy liaison to see on cost analysis, this can be done outside MTM referral. Noted MTM referral was placed as well but not scheduled. -[facility] Dr. Garay,  Pt called in to report his insurance does not require a PA for Repatha. OK for me to place order, 140 mg subcutaneously every 2 weeks?  Thanks,  Mal

## 2025-03-18 NOTE — TELEPHONE ENCOUNTER
[facility] Call Center    Phone Message    May a detailed message be left on voicemail: yes     Reason for Call: Medication Question or concern regarding medication   Prescription Clarification  Name of Medication: repatha  Prescribing Provider: Dr. Garay   Pharmacy: [address] [phone]   What on the order needs clarification? Pt stated per insurance no PA needed.  Please call pt to instruct on how frequently for dosaging.      Action Taken: Other: cardio    Travel Screening: Not Applicable     Date of Service:

## 2025-03-19 ENCOUNTER — TELEPHONE (OUTPATIENT)
Dept: CARDIOLOGY | Facility: CLINIC | Age: 60
End: 2025-03-19

## 2025-03-19 NOTE — TELEPHONE ENCOUNTER
----- Message -----  From: Shant Garay  Sent: 3/19/2025  11:02 AM CDT  To: Sabrina Mckeon, RN    8-10 weeks after first dose; I think he is supposed to see him for fu; we can draw labs then; no need for fasting    thanlks  ----- Message -----  From: Sabrina Mckeon RN  Sent: 3/19/2025  10:39 AM CDT  To: Shant Garay    ----- Message from Sabrina Mckeon RN sent at 3/19/2025 10:39 AM CDT -----  Hi Dr. Garay,  This patient was able to get his Repatha yesterday and gave himself the first injection today without issue! Do you want repeat lipid panel before your visit in June or should he just come in fasting?  Thanks!  Mal            ==Noted. Scheduled follow up in place. No order placed as will be done at visit per Dr. Garay. -[facility]

## 2025-03-19 NOTE — TELEPHONE ENCOUNTER
MTM referral from: [facility] visit (referral by provider)    MTM referral outreach attempt #2 on March 19, 2025 at 9:37 AM      Outcome: Patient is not interested in scheduling at this moment     Use hbc for the carrier/Plan on the flowsheet          Jailyn Fitzgerald  MTM

## 2025-03-19 NOTE — TELEPHONE ENCOUNTER
Medication Request (repatha)  (Newest Message First)  View All Conversations on this Encounter  Mony Velasquez  You16 hours ago (4:39 PM)     [facility]  Hello!  The Repatha is covered but the copay is a little high. I wasn't able to see if the patient has a deductible or not but since he has commercial insurance he should be eligible for a copay card through the .  I added the website in the encounter message if the patient is interested in looking into it.  If you need anything else let me know, thanks!  Mony         Called Ricardo and  updated on above. Pt verbalized understanding and did apply for Repatha copay card, which did not work for some reason. [employer] used another cost-savings card for him and the cost for the month was 25 dollars. He self-injected this AM - no issues. He watched videos on repatha website and historically has given injections to family members for other reasons. Pt will follow up as scheduled in June with Dr. Garay. He will plan to come in fasting for repeat lipids but will double check to see if she wants any sooner. -[facility]

## 2025-06-25 ENCOUNTER — RESULTS FOLLOW-UP (OUTPATIENT)
Dept: CARDIOLOGY | Facility: CLINIC | Age: 60
End: 2025-06-25

## 2025-06-25 ENCOUNTER — OFFICE VISIT (OUTPATIENT)
Dept: CARDIOLOGY | Facility: CLINIC | Age: 60
End: 2025-06-25
Payer: COMMERCIAL

## 2025-06-25 VITALS
DIASTOLIC BLOOD PRESSURE: 84 MMHG | SYSTOLIC BLOOD PRESSURE: 124 MMHG | HEART RATE: 68 BPM | RESPIRATION RATE: 16 BRPM | WEIGHT: 189 LBS | BODY MASS INDEX: 24.27 KG/M2

## 2025-06-25 DIAGNOSIS — E78.00 PURE HYPERCHOLESTEROLEMIA: Primary | ICD-10-CM

## 2025-06-25 DIAGNOSIS — R93.1 AGATSTON CORONARY ARTERY CALCIUM SCORE BETWEEN 200 AND 399: ICD-10-CM

## 2025-06-25 DIAGNOSIS — Z78.9 STATIN INTOLERANCE: ICD-10-CM

## 2025-06-25 DIAGNOSIS — Z82.49 FAMILY HISTORY OF ISCHEMIC HEART DISEASE: ICD-10-CM

## 2025-06-25 LAB
CHOLEST SERPL-MCNC: 134 MG/DL
FASTING STATUS PATIENT QL REPORTED: YES
HDLC SERPL-MCNC: 69 MG/DL
LDLC SERPL CALC-MCNC: 49 MG/DL
NONHDLC SERPL-MCNC: 65 MG/DL
TRIGL SERPL-MCNC: 79 MG/DL

## 2025-06-25 PROCEDURE — 80061 LIPID PANEL: CPT | Performed by: INTERNAL MEDICINE

## 2025-06-25 PROCEDURE — 3079F DIAST BP 80-89 MM HG: CPT | Performed by: INTERNAL MEDICINE

## 2025-06-25 PROCEDURE — 36415 COLL VENOUS BLD VENIPUNCTURE: CPT | Performed by: INTERNAL MEDICINE

## 2025-06-25 PROCEDURE — G2211 COMPLEX E/M VISIT ADD ON: HCPCS | Performed by: INTERNAL MEDICINE

## 2025-06-25 PROCEDURE — 3074F SYST BP LT 130 MM HG: CPT | Performed by: INTERNAL MEDICINE

## 2025-06-25 PROCEDURE — 99214 OFFICE O/P EST MOD 30 MIN: CPT | Performed by: INTERNAL MEDICINE

## 2025-06-25 NOTE — LETTER
6/25/2025    Juan Bullard MD  [address] [place] [address]    RE: Juan Joseph       Dear Colleague,     I had the pleasure of seeing Juan Joseph in the [facility].    [facility] [address]  [place] [place] [address]   [email or web address]   OFFICE: [phone]     CARDIOLOGY CLINIC NOTE     Assessment/Recommendations   Assessment:      Elevated Calcium Score:   Statin Intolerance.  FH of early CAD  HLD     Plan:  , correlating to an arterial age of 83, + FH of early CAD.  Has tried crestor and lipitor in the past with severe myopathy symptoms, currently on pcks9I; check lasb today; further recs pending results.          The longitudinal plan of care for the diagnosis(es)/condition(s) as documented were addressed during this visit. Due to the added complexity in care, I will continue to support Juan in the subsequent management and with ongoing continuity of care.        Mr. Juan Joseph is a 59 year old male with h/o HLD, FH of early CAD,elevated CAC, statin intolerance.     His father had a history of bypass surgeries in his 30s. ; stress test negative. Started lipitor and crestor; side effects to both- brain fog and muscle weakness.      Very active- runs marathons, healty diet, non smoker          CAC 6/26/20: Total Agatston calcium score is 350, which places the patient at the 94th percentile in comparison to subjects of the same age, gender, and race/ethnicity who are free of clinical cardiovascular disease and treated   diabetes in the [employer] database.      ECG (reveiwed independently): NSR, 1st AVB    Physical Examination Review of Systems   Vitals: There were no vitals taken for this visit.  BMI= There is no height or weight on file to calculate BMI.  Wt Readings from Last 3 Encounters:   03/14/25 87.5 kg (193 lb)   03/01/22 83 kg (183 lb)   04/29/21 77.6 kg (171 lb)       General: pleasant male.  No acute distress.   Neck: No JVD  Lungs: clear to auscultation  COR:  regular rate and rhythm, No murmurs, rubs, or gallops  Extrem: No edema   Per HPI     Medications  Allergies   Current Outpatient Medications   Medication Sig Dispense Refill     evolocumab (REPATHA) 140 MG/ML prefilled autoinjector Inject 1 mL (140 mg) subcutaneously every 14 days. 2 mL 11     multivit with minerals/lutein (MULTIVITAMIN 50 PLUS ORAL) [MULTIVIT WITH MINERALS/LUTEIN (MULTIVITAMIN 50 PLUS ORAL)] 1 tab(s)        No Known Allergies          Lab Results    Chemistry/lipid CBC Cardiac Enzymes/BNP/TSH/INR   Lab Results   Component Value Date    CHOL 221 (H) 03/06/2025    HDL 58 03/06/2025    TRIG 91 03/06/2025    BUN 14.0 03/06/2025     03/06/2025    CO2 27 03/06/2025    Lab Results   Component Value Date    WBC 5.8 03/01/2022    HGB 15.4 03/01/2022    HCT 43.2 03/01/2022    MCV 92 03/01/2022     03/01/2022    Lab Results   Component Value Date    TROPONINI <0.01 03/01/2022    TSH 2.04 09/24/2021          The longitudinal plan of care for the diagnosis(es)/condition(s) as documented were addressed during this visit. Due to the added complexity in care, I will continue to support Juan in the subsequent management and with ongoing continuity of care.          Shant Garay MD, MPH  Non-invasive Cardiologist  [facility]         Thank you for allowing me to participate in the care of your patient.      Sincerely,     Shant JARAMILLO [facility]  cc:   Shant Garay  [address] [place] [address]

## 2025-06-25 NOTE — PROGRESS NOTES
[facility] [address]  [place] [address]   [email or web address]   OFFICE: [phone]     CARDIOLOGY CLINIC NOTE     Assessment/Recommendations   Assessment:      Elevated Calcium Score:   Statin Intolerance.  FH of early CAD  HLD     Plan:  , correlating to an arterial age of 83, + FH of early CAD.  Has tried crestor and lipitor in the past with severe myopathy symptoms, currently on pcks9I; check lasb today; further recs pending results.          The longitudinal plan of care for the diagnosis(es)/condition(s) as documented were addressed during this visit. Due to the added complexity in care, I will continue to support Juan in the subsequent management and with ongoing continuity of care.        Mr. Juan Joseph is a 59 year old male with h/o HLD, FH of early CAD,elevated CAC, statin intolerance.     His father had a history of bypass surgeries in his 30s. ; stress test negative. Started lipitor and crestor; side effects to both- brain fog and muscle weakness.      Very active- runs marathons, healty diet, non smoker          CAC 6/26/20: Total Agatston calcium score is 350, which places the patient at the 94th percentile in comparison to subjects of the same age, gender, and race/ethnicity who are free of clinical cardiovascular disease and treated   diabetes in the [employer] database.      ECG (reveiwed independently): NSR, 1st AVB    Physical Examination Review of Systems   Vitals: There were no vitals taken for this visit.  BMI= There is no height or weight on file to calculate BMI.  Wt Readings from Last 3 Encounters:   03/14/25 87.5 kg (193 lb)   03/01/22 83 kg (183 lb)   04/29/21 77.6 kg (171 lb)       General: pleasant male. No acute distress.   Neck: No JVD  Lungs: clear to auscultation  COR:  regular rate and rhythm, No murmurs, rubs, or gallops  Extrem: No edema   Per HPI     Medications  Allergies   Current Outpatient  Medications   Medication Sig Dispense Refill    evolocumab (REPATHA) 140 MG/ML prefilled autoinjector Inject 1 mL (140 mg) subcutaneously every 14 days. 2 mL 11    multivit with minerals/lutein (MULTIVITAMIN 50 PLUS ORAL) [MULTIVIT WITH MINERALS/LUTEIN (MULTIVITAMIN 50 PLUS ORAL)] 1 tab(s)        No Known Allergies          Lab Results    Chemistry/lipid CBC Cardiac Enzymes/BNP/TSH/INR   Lab Results   Component Value Date    CHOL 221 (H) 03/06/2025    HDL 58 03/06/2025    TRIG 91 03/06/2025    BUN 14.0 03/06/2025     03/06/2025    CO2 27 03/06/2025    Lab Results   Component Value Date    WBC 5.8 03/01/2022    HGB 15.4 03/01/2022    HCT 43.2 03/01/2022    MCV 92 03/01/2022     03/01/2022    Lab Results   Component Value Date    TROPONINI <0.01 03/01/2022    TSH 2.04 09/24/2021          The longitudinal plan of care for the diagnosis(es)/condition(s) as documented were addressed during this visit. Due to the added complexity in care, I will continue to support Juan in the subsequent management and with ongoing continuity of care.          Shant Garay MD, MPH  Non-invasive Cardiologist  [facility]

## 2025-06-29 ENCOUNTER — HEALTH MAINTENANCE LETTER (OUTPATIENT)
Age: 60
End: 2025-06-29